# Patient Record
(demographics unavailable — no encounter records)

---

## 2024-11-05 NOTE — PHYSICAL EXAM
[Normal] : supple, no neck mass and thyroid not enlarged [Normal Neck Lymph Nodes] : normal neck lymph nodes  [Normal Supraclavicular Lymph Nodes] : normal supraclavicular lymph nodes [Normal] : normal appearance, no rash, nodules, vesicles, ulcers, erythema [de-identified] : Groins not examined [de-identified] : below

## 2024-11-05 NOTE — REASON FOR VISIT
[Follow-Up Visit] : a follow-up visit for [Other: _____] : [unfilled] [FreeTextEntry2] : Yearly breast exam, breast cancer risk score = 57.6, history of LCIS.

## 2024-11-05 NOTE — PHYSICAL EXAM
[Normal] : supple, no neck mass and thyroid not enlarged [Normal Neck Lymph Nodes] : normal neck lymph nodes  [Normal Supraclavicular Lymph Nodes] : normal supraclavicular lymph nodes [Normal] : normal appearance, no rash, nodules, vesicles, ulcers, erythema [de-identified] : Groins not examined [de-identified] : below

## 2024-11-05 NOTE — ASSESSMENT
[FreeTextEntry1] : 71-year-old lady.  Follow-up right breast LCIS. Breast cancer risk score = 57.6.  Today she is asymptomatic with a normal physical examination.  10/15/2018: Bilateral breast MRI at [facility]: BI-RADS 1. She DECLINES follow-up breast MRIs.  4/26/2024: Annual bilateral mammogram and sonogram at [facility]: BI-RADS 2. Prescription entered today for April 2025.   Clinically doing well.  If asymptomatic, with normal imaging, we should see her in another year, sooner if needed.

## 2024-11-05 NOTE — REVIEW OF SYSTEMS
[Negative] : Integumentary [FreeTextEntry5] : Hypertension [FreeTextEntry6] : Enrolled in lung cancer screening [FreeTextEntry7] : Allergic to tetracycline. [de-identified] : Medications include Effexor. [de-identified] : Diabetes [FreeTextEntry1] : Increased risk of breast cancer

## 2024-11-05 NOTE — ASSESSMENT
[FreeTextEntry1] : 71-year-old lady.  Follow-up right breast LCIS. Breast cancer risk score = 57.6.  Today she is asymptomatic with a normal physical examination.  10/15/2018: Bilateral breast MRI at [facility]: BI-RADS 1. She DECLINES follow-up breast MRIs.  4/26/2024: Annual bilateral mammogram and sonogram at [facility]: BI-RADS 2. Prescription entered today for April 2025.   Clinically doing well.  If asymptomatic, with normal imaging, we should see her in another year, sooner if needed.

## 2024-11-05 NOTE — PHYSICAL EXAM
[Normal] : supple, no neck mass and thyroid not enlarged [Normal Neck Lymph Nodes] : normal neck lymph nodes  [Normal Supraclavicular Lymph Nodes] : normal supraclavicular lymph nodes [Normal] : normal appearance, no rash, nodules, vesicles, ulcers, erythema [de-identified] : Groins not examined [de-identified] : below

## 2024-11-05 NOTE — REVIEW OF SYSTEMS
[Negative] : Integumentary [FreeTextEntry5] : Hypertension [FreeTextEntry6] : Enrolled in lung cancer screening [FreeTextEntry7] : Allergic to tetracycline. [de-identified] : Medications include Effexor. [de-identified] : Diabetes [FreeTextEntry1] : Increased risk of breast cancer

## 2024-11-05 NOTE — ASSESSMENT
[FreeTextEntry1] : 71-year-old lady.  Follow-up right breast LCIS. Breast cancer risk score = 57.6.  Today she is asymptomatic with a normal physical examination.  10/15/2018: Bilateral breast MRI at [facility]: BI-RADS 1. She DECLINES follow-up breast MRIs.  4/26/2024: Annual bilateral mammogram and sonogram at [place]: BI-RADS 2. Prescription entered today for April 2025.   Clinically doing well.  If asymptomatic, with normal imaging, we should see her in another year, sooner if needed.

## 2024-11-05 NOTE — REVIEW OF SYSTEMS
[Negative] : Integumentary [FreeTextEntry5] : Hypertension [FreeTextEntry6] : Enrolled in lung cancer screening [FreeTextEntry7] : Allergic to tetracycline. [de-identified] : Medications include Effexor. [de-identified] : Diabetes [FreeTextEntry1] : Increased risk of breast cancer